# Patient Record
Sex: MALE | Race: WHITE | NOT HISPANIC OR LATINO | Employment: UNEMPLOYED | URBAN - METROPOLITAN AREA
[De-identification: names, ages, dates, MRNs, and addresses within clinical notes are randomized per-mention and may not be internally consistent; named-entity substitution may affect disease eponyms.]

---
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
## 2018-01-10 NOTE — RESULT NOTES
Message   labs and UA wnl  Verified Results  (1) COMPREHENSIVE METABOLIC PANEL [id number] 37:35UD [facility]   TW Order Number: [id number]     Test Name Result Flag Reference   GLUCOSE,RANDM 90 mg/dL     If the patient is fasting, the ADA then defines impaired fasting glucose as > 100 mg/dL and diabetes as > or equal to 123 mg/dL  SODIUM 139 mmol/L  136-145   POTASSIUM 4 0 mmol/L  3 5-5 3   CHLORIDE 104 mmol/L  100-108   CARBON DIOXIDE 30 mmol/L  21-32   ANION GAP (CALC) 5 mmol/L  4-13   BLOOD UREA NITROGEN 17 mg/dL  5-25   CREATININE 1 24 mg/dL  0 60-1 30   Standardized to IDMS reference method   CALCIUM 9 1 mg/dL  8 3-10 1   BILI, TOTAL 0 75 mg/dL  0 20-1 00   ALK PHOSPHATAS 108 U/L     ALT (SGPT) 65 U/L  12-78   AST(SGOT) 31 U/L  5-45   ALBUMIN 4 3 g/dL  3 5-5 0   TOTAL PROTEIN 7 7 g/dL  6 4-8 2   eGFR Non-African American      >60 0 ml/min/1 73sq m   [facility] [employer] Disease Education Program recommendations are as follows:  GFR calculation is accurate only with a steady state creatinine  Chronic Kidney disease less than 60 ml/min/1 73 sq  meters  Kidney failure less than 15 ml/min/1 73 sq  meters       (1) CBC/PLT/DIFF 27Oct2016 09:56AM [facility]   TW Order Number: [id number]     Test Name Result Flag Reference   WBC COUNT 6 69 Thousand/uL  4 31-10 16   RBC COUNT 5 53 Million/uL  3 88-5 62   HEMOGLOBIN 16 3 g/dL  12 0-17 0   HEMATOCRIT 46 9 %  36 5-49 3   MCV 85 fL  82-98   MCH 29 5 pg  26 8-34 3   MCHC 34 8 g/dL  31 4-37 4   RDW 12 7 %  11 6-15 1   MPV 9 6 fL  8 9-12 7   PLATELET COUNT 177 Thousands/uL  149-390   nRBC AUTOMATED 0 /100 WBCs     NEUTROPHILS RELATIVE PERCENT 64 %  43-75   LYMPHOCYTES RELATIVE PERCENT 23 %  14-44   MONOCYTES RELATIVE PERCENT 11 %  4-12   EOSINOPHILS RELATIVE PERCENT 2 %  0-6   BASOPHILS RELATIVE PERCENT 0 %  0-1   NEUTROPHILS ABSOLUTE COUNT 4 26 Thousands/?L  1 85-7 62   LYMPHOCYTES ABSOLUTE COUNT 1 54 Thousands/?L  0 60-4 47 MONOCYTES ABSOLUTE COUNT 0 71 Thousand/?L  0 17-1 22   EOSINOPHILS ABSOLUTE COUNT 0 13 Thousand/?L  0 00-0 61   BASOPHILS ABSOLUTE COUNT 0 03 Thousands/?L  0 00-0 10   - Patient Instructions: This bloodwork is non-fasting  Please drink two glasses of water morning of bloodwork  - Patient Instructions: This bloodwork is non-fasting  Please drink two glasses of water morning of bloodwork       (1) URINALYSIS (will reflex a microscopy if leukocytes, occult blood, protein or nitrites are not within normal limits) 27Oct2016 09:56AM Nayana Anderson    Order Number: [id number]     Test Name Result Flag Reference   COLOR Dk Yellow     CLARITY Turbid     SPECIFIC GRAVITY UA 1 029  1 003-1 030   PH UA 6 0  4 5-8 0   LEUKOCYTE ESTERASE UA Negative  Negative   NITRITE UA Negative  Negative   PROTEIN UA Trace mg/dl A Negative   GLUCOSE UA Negative mg/dl  Negative   KETONES UA Negative mg/dl  Negative   UROBILINOGEN UA 0 2 E U /dl  0 2, 1 0 E U /dl   BILIRUBIN UA Negative  Negative   BLOOD UA Negative  Negative

## 2018-01-12 VITALS
SYSTOLIC BLOOD PRESSURE: 138 MMHG | BODY MASS INDEX: 33.91 KG/M2 | WEIGHT: 250.38 LBS | HEIGHT: 72 IN | DIASTOLIC BLOOD PRESSURE: 72 MMHG

## 2018-01-14 VITALS
BODY MASS INDEX: 34.13 KG/M2 | TEMPERATURE: 98.6 F | DIASTOLIC BLOOD PRESSURE: 70 MMHG | HEIGHT: 72 IN | WEIGHT: 252 LBS | SYSTOLIC BLOOD PRESSURE: 120 MMHG

## 2018-01-14 NOTE — MISCELLANEOUS
Message  Return to work or school:   Kylah Washington is under my professional care  He was seen in my office on 11/03/2016       Gordy Paget is cleared to play football     Shermon Block Do/am       Signatures   Electronically signed by : Cielo Khanna, ; Nov  3 2016  1:34PM EST                       (Author)

## 2018-01-15 NOTE — PROGRESS NOTES
Assessment    1  Encounter for preventive health examination (V70 0) (Z00 00)    Plan  Health Maintenance    · Follow-up visit in 1 year Evaluation and Treatment  Follow-up  Status: Hold For -  Scheduling  Requested for: [id number]    Discussion/Summary    Impression:   No growth, development, elimination, feeding, skin and sleep concerns  no medical problems  Anticipatory guidance addressed as per the history of present illness section  No vaccines needed  He is not on any medications  Information discussed with patient and mother  Pt  here for football PE  Doing well, diet and exercise encouraged, call if any problems  IUTD  Preventive medicine discusssed  The patient was counseled regarding  Chief Complaint  PT is here today for a school physical       History of Present Illness  HPI: Here for general PE  No cp or sob, or ha  Plays football at [employer]  No complaints, 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  Review of Systems    Constitutional: No complaints of tiredness, feels well, no fever, no chills, no recent weight gain or loss  Eyes: No complaints of eye pain, no discharge from eyes, no eyesight problems, eyes do not itch, no red or dry eyes  ENT: no complaints of nasal discharge, no earache, no loss of hearing, no hoarseness or sore throat, no nosebleeds  Cardiovascular: No complaints of chest pain, no palpitations, normal heart rate, no leg claudication or lower leg edema  Respiratory: No complaints of shortness of breath, no wheezing or cough, no dyspnea on exertion  Gastrointestinal: No complaints of abdominal pain, no nausea or vomiting, no constipation, no diarrhea or bloody stools  Genitourinary: No complaints of testicular pain, no dysuria or nocturia, no incontinence, no hesitancy, no gential lesion  Musculoskeletal: No complaints of joint stiffness or swelling, no myalgias, no limb pain or swelling     Integumentary: No complaints of skin rash, no skin lesions or wounds, no itching, no dry skin  Neurological: No complaints of headache, no numbness or tingling, no dizziness or fainting, no confusion, no convulsions, no limb weakness or difficulty walking  Psychiatric: No complaints of feeling depressed, no suicidal thoughts, no emotional problems, no anxiety, no sleep disturbances or changes in personality  Endocrine: No complaints of muscle weakness, no feelings of weakness, no erectile dysfunction, no deepening of voice, no hot flashes or proptosis  Hematologic/Lymphatic: No complaints of swollen glands, no neck swollen glands, does not bleed or bruise easily  ROS reported by the patient  Active Problems    1  Cellulitis (682 9) (L03 90)   2  Contact dermatitis (692 9) (L25 9)    Surgical History    · History of Oral Surgery Tooth Extraction    Family History  Mother    · No pertinent family history   · No pertinent family history  Father    · No pertinent family history    Social History    · Activities: Football   · Activities: Lacrosse   · Activities: Weight training   · Currently in 10th grade   · Never a smoker   · Never a smoker    Current Meds   1  Benadryl CREA; Therapy: (Recorded:58Wda9508) to Recorded   2  Benadryl TABS; Therapy: (Recorded:01May2015) to Recorded    Allergies    1  Red Dye    2  Strawberry    Vitals   Recorded: 05JNC7904 45:11QR   Systolic 416   Diastolic 80   Height 5 ft 11 5 in   Weight 243 lb 4 00 oz   BMI Calculated 33 45   BSA Calculated 2 3     Physical Exam    Constitutional - General appearance: No acute distress, well appearing and well nourished  Eyes - Conjunctiva and lids: No injection, edema or discharge  Pupils and irises: Equal, round, reactive to light bilaterally  Ophthalmoscopic examination: Optic discs sharp  Ears, Nose, Mouth, and Throat - External inspection of ears and nose: Normal without deformities or discharge   Otoscopic examination: Tympanic membranes gray, translucent with good bony landmarks and light reflex  Canals patent without erythema  Hearing: Normal  Nasal mucosa, septum, and turbinates: Normal, no edema or discharge  Lips, teeth, and gums: Normal, good dentition  Oropharynx: Moist mucosa, normal tongue and tonsils without lesions  Neck - Neck: Supple, symmetric, no masses  Thyroid: No thyromegaly  Pulmonary - Respiratory effort: Normal respiratory rate and rhythm, no increased work of breathing  Percussion of chest: Normal  Palpation of chest: Normal  Auscultation of lungs: Clear bilaterally  Cardiovascular - Palpation of heart: Normal PMI, no thrill  Auscultation of heart: Regular rate and rhythm, normal S1 and S2, no murmur  Carotid pulses: Normal, 2+ bilaterally  Abdominal aorta: Normal  Femoral pulses: Normal, 2+ bilaterally  Pedal pulses: Normal, 2+ bilaterally  Examination of extremities for edema and/or varicosities: Normal    Chest - Breasts: Normal  Palpation of breasts and axillae: Normal    Abdomen - Abdomen: Normal bowel sounds, soft, non-tender, no masses  Liver and spleen: No hepatomegaly or splenomegaly  Examination for hernias: No hernias palpated  Genitourinary - Scrotal contents: Normal, no masses appreciated  Penis: Normal, no lesions  Lymphatic - Palpation of lymph nodes in neck: No anterior or posterior cervical lymphadenopathy  Palpation of lymph nodes in axillae: No lymphadenopathy  Palpation of lymph nodes in groin: No lymphadenopathy  Palpation of lymph nodes in other areas: No lymphadenopathy  Musculoskeletal - Gait and station: Normal gait  Digits and nails: Normal without clubbing or cyanosis  Inspection/palpation of joints, bones, and muscles: Normal  Evaluation for scoliosis: No scoliosis on exam  Range of motion: Normal  Stability: No joint instability  Muscle strength/tone: Normal    Skin - Skin and subcutaneous tissue: No rash or lesions   Palpation of skin and subcutaneous tissue: Normal    Neurologic - Cranial nerves: Normal  Reflexes: Normal  Sensation: Normal    Psychiatric - judgment and insight: Normal  Orientation to person, place, and time: Normal  Recent and remote memory: Normal  Mood and affect: Normal       Signatures   Electronically signed by : Sheryle Numbers, DO; Jul 28 2016  1:47PM EST                       (Author)

## 2018-01-15 NOTE — RESULT NOTES
Message   celiac disease labs wnl  Verified Results  (1) CELIAC DISEASE AB PROFILE 27Oct2016 09:56AM Nayana Anderson    Order Number: [id number]     Test Name Result Flag Reference   tTG IGG <2 U/mL  0 - 5   Negative        0 - 5                                Weak Positive   6 - 9                                Positive           >9   tTG IGA <2 U/mL  0 - 3   Negative        0 -  3                                Weak Positive   4 - 10                                Positive           >10   Tissue Transglutaminase (tTG) has been identified   as the endomysial antigen  Studies have demonstr-   ated that endomysial IgA antibodies have over 99%   specificity for gluten sensitive enteropathy     GLIADA 2 units  0 - 19   Negative                   0 - 19                     Weak Positive             20 - 30                     Moderate to Strong Positive   >30   GLIADG 2 units  0 - 19   Negative                   0 - 19                     Weak Positive             20 - 30                     Moderate to Strong Positive   >30   ENDOMYSIAL AB IGA Negative  Negative   Performed at:  [facility] [employer] [address]  [id number]  : Jodi Waddell MD, Phone:  [phone]    mg/dL  90 - 386

## 2018-01-16 NOTE — RESULT NOTES
Verified Results  (1) URINALYSIS (will reflex a microscopy if leukocytes, occult blood, protein or nitrites are not within normal limits) 27Oct2016 09:56AM Armando [facility]wdows    Order Number: [id number]     Test Name Result Flag Reference   COLOR Dk Yellow     CLARITY Turbid     SPECIFIC GRAVITY UA 1 029  1 003-1 030   PH UA 6 0  4 5-8 0   LEUKOCYTE ESTERASE UA Negative  Negative   NITRITE UA Negative  Negative   PROTEIN UA Trace mg/dl A Negative   GLUCOSE UA Negative mg/dl  Negative   KETONES UA Negative mg/dl  Negative   UROBILINOGEN UA 0 2 E U /dl  0 2, 1 0 E U /dl   BILIRUBIN UA Negative  Negative   BLOOD UA Negative  Negative   AMORPHOUS PHOSPHATES Moderate /hpf     BACTERIA None Seen /hpf  None Seen, Occasional   EPITHELIAL CELLS None Seen /hpf  None Seen, Occasional   RBC UA None Seen /hpf  None Seen   WBC UA None Seen /hpf  None Seen

## 2018-03-15 ENCOUNTER — TELEPHONE (OUTPATIENT)
Dept: FAMILY MEDICINE CLINIC | Facility: CLINIC | Age: 18
End: 2018-03-15

## 2018-03-15 DIAGNOSIS — J32.9 SINUSITIS, UNSPECIFIED CHRONICITY, UNSPECIFIED LOCATION: Primary | ICD-10-CM

## 2018-03-15 RX ORDER — AZITHROMYCIN 250 MG/1
TABLET, FILM COATED ORAL
Qty: 6 TABLET | Refills: 0 | Status: SHIPPED | OUTPATIENT
Start: 2018-03-15 | End: 2018-03-20

## 2018-03-15 NOTE — TELEPHONE ENCOUNTER
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

## 2018-12-18 ENCOUNTER — OFFICE VISIT (OUTPATIENT)
Dept: FAMILY MEDICINE CLINIC | Facility: CLINIC | Age: 18
End: 2018-12-18
Payer: COMMERCIAL

## 2018-12-18 VITALS
WEIGHT: 226.2 LBS | HEIGHT: 72 IN | SYSTOLIC BLOOD PRESSURE: 138 MMHG | BODY MASS INDEX: 30.64 KG/M2 | DIASTOLIC BLOOD PRESSURE: 86 MMHG

## 2018-12-18 DIAGNOSIS — Z23 ENCOUNTER FOR IMMUNIZATION: ICD-10-CM

## 2018-12-18 DIAGNOSIS — E66.9 OBESITY (BMI 30-39.9): ICD-10-CM

## 2018-12-18 DIAGNOSIS — Z00.00 HEALTH CARE MAINTENANCE: Primary | ICD-10-CM

## 2018-12-18 DIAGNOSIS — Z13.220 SCREENING FOR HYPERLIPIDEMIA: ICD-10-CM

## 2018-12-18 DIAGNOSIS — R63.4 WEIGHT LOSS: ICD-10-CM

## 2018-12-18 PROCEDURE — 99395 PREV VISIT EST AGE 18-39: CPT | Performed by: FAMILY MEDICINE

## 2018-12-18 PROCEDURE — 90686 IIV4 VACC NO PRSV 0.5 ML IM: CPT | Performed by: FAMILY MEDICINE

## 2018-12-18 PROCEDURE — 90471 IMMUNIZATION ADMIN: CPT | Performed by: FAMILY MEDICINE

## 2018-12-18 NOTE — PATIENT INSTRUCTIONS
Here for general PE and also lost weight and will need updated labs  Call if any problems  Continue with diet and exercise  Flu shot today  Recheck yearly

## 2018-12-18 NOTE — PROGRESS NOTES
Assessment/Plan:  Chief Complaint   Patient presents with    Physical Exam     Patient Instructions   Here for general PE and also lost weight and will need updated labs  Call if any problems  Continue with diet and exercise  Flu shot today  Recheck yearly  No problem-specific Assessment & Plan notes found for this encounter  Diagnoses and all orders for this visit:    Health care maintenance  -     Comprehensive metabolic panel; Future  -     CBC and differential; Future  -     Lipid Panel with Direct LDL reflex; Future    Obesity (BMI 30-39 9)  -     Comprehensive metabolic panel; Future  -     CBC and differential; Future    Screening for hyperlipidemia  -     Comprehensive metabolic panel; Future  -     Lipid Panel with Direct LDL reflex; Future    Weight loss          Subjective:      Patient ID: Addison Callaway is a 19 y o  male  Here for General PE and doing well  He lost weight after being 89 and lost weight by diet and exercise  Went vegetarian and no longer vegetarian now and running and lifting helped lose weight  The following portions of the patient's history were reviewed and updated as appropriate: allergies, current medications, past family history, past medical history, past social history, past surgical history and problem list     Review of Systems   Constitutional:        Obesity   HENT: Negative  Eyes: Negative  Respiratory: Negative  Cardiovascular: Negative  Gastrointestinal: Negative  Endocrine: Negative  Genitourinary: Negative  Musculoskeletal: Negative  Skin: Negative  Allergic/Immunologic: Negative  Neurological: Negative  Hematological: Negative  Psychiatric/Behavioral: Negative  Objective:      /86   Ht 5' 11 5" (1 816 m)   Wt 103 kg (226 lb 3 2 oz)   BMI 31 11 kg/m²          Physical Exam   Constitutional: He is oriented to person, place, and time  He appears well-developed and well-nourished     obesity HENT:   Head: Normocephalic and atraumatic  Right Ear: External ear normal    Left Ear: External ear normal    Nose: Nose normal    Mouth/Throat: Oropharynx is clear and moist    Eyes: Pupils are equal, round, and reactive to light  Conjunctivae and EOM are normal    Neck: Normal range of motion  Neck supple  Cardiovascular: Normal rate, regular rhythm, normal heart sounds and intact distal pulses  Pulmonary/Chest: Effort normal and breath sounds normal    Musculoskeletal: Normal range of motion  Neurological: He is alert and oriented to person, place, and time  He has normal reflexes  Skin: Skin is warm and dry  Psychiatric: He has a normal mood and affect   His behavior is normal

## 2018-12-19 ENCOUNTER — APPOINTMENT (OUTPATIENT)
Dept: LAB | Facility: CLINIC | Age: 18
End: 2018-12-19
Payer: COMMERCIAL

## 2018-12-19 DIAGNOSIS — Z00.00 HEALTH CARE MAINTENANCE: ICD-10-CM

## 2018-12-19 DIAGNOSIS — E66.9 OBESITY (BMI 30-39.9): ICD-10-CM

## 2018-12-19 DIAGNOSIS — Z13.220 SCREENING FOR HYPERLIPIDEMIA: ICD-10-CM

## 2018-12-19 LAB
ALBUMIN SERPL BCP-MCNC: 4.1 G/DL (ref 3.5–5)
ALP SERPL-CCNC: 71 U/L (ref 46–116)
ALT SERPL W P-5'-P-CCNC: 38 U/L (ref 12–78)
ANION GAP SERPL CALCULATED.3IONS-SCNC: 7 MMOL/L (ref 4–13)
AST SERPL W P-5'-P-CCNC: 20 U/L (ref 5–45)
BASOPHILS # BLD AUTO: 0.06 THOUSANDS/ΜL (ref 0–0.1)
BASOPHILS NFR BLD AUTO: 1 % (ref 0–1)
BILIRUB SERPL-MCNC: 0.65 MG/DL (ref 0.2–1)
BUN SERPL-MCNC: 12 MG/DL (ref 5–25)
CALCIUM SERPL-MCNC: 9.5 MG/DL (ref 8.3–10.1)
CHLORIDE SERPL-SCNC: 103 MMOL/L (ref 100–108)
CHOLEST SERPL-MCNC: 131 MG/DL (ref 50–200)
CO2 SERPL-SCNC: 27 MMOL/L (ref 21–32)
CREAT SERPL-MCNC: 1.23 MG/DL (ref 0.6–1.3)
EOSINOPHIL # BLD AUTO: 0.14 THOUSAND/ΜL (ref 0–0.61)
EOSINOPHIL NFR BLD AUTO: 2 % (ref 0–6)
ERYTHROCYTE [DISTWIDTH] IN BLOOD BY AUTOMATED COUNT: 13 % (ref 11.6–15.1)
GFR SERPL CREATININE-BSD FRML MDRD: 84 ML/MIN/1.73SQ M
GLUCOSE P FAST SERPL-MCNC: 83 MG/DL (ref 65–99)
HCT VFR BLD AUTO: 50.9 % (ref 36.5–49.3)
HDLC SERPL-MCNC: 48 MG/DL (ref 40–60)
HGB BLD-MCNC: 16.5 G/DL (ref 12–17)
IMM GRANULOCYTES # BLD AUTO: 0.02 THOUSAND/UL (ref 0–0.2)
IMM GRANULOCYTES NFR BLD AUTO: 0 % (ref 0–2)
LDLC SERPL CALC-MCNC: 70 MG/DL (ref 0–100)
LYMPHOCYTES # BLD AUTO: 1.52 THOUSANDS/ΜL (ref 0.6–4.47)
LYMPHOCYTES NFR BLD AUTO: 26 % (ref 14–44)
MCH RBC QN AUTO: 29.3 PG (ref 26.8–34.3)
MCHC RBC AUTO-ENTMCNC: 32.4 G/DL (ref 31.4–37.4)
MCV RBC AUTO: 90 FL (ref 82–98)
MONOCYTES # BLD AUTO: 0.78 THOUSAND/ΜL (ref 0.17–1.22)
MONOCYTES NFR BLD AUTO: 14 % (ref 4–12)
NEUTROPHILS # BLD AUTO: 3.24 THOUSANDS/ΜL (ref 1.85–7.62)
NEUTS SEG NFR BLD AUTO: 57 % (ref 43–75)
NRBC BLD AUTO-RTO: 0 /100 WBCS
PLATELET # BLD AUTO: 269 THOUSANDS/UL (ref 149–390)
PMV BLD AUTO: 9.8 FL (ref 8.9–12.7)
POTASSIUM SERPL-SCNC: 3.9 MMOL/L (ref 3.5–5.3)
PROT SERPL-MCNC: 7.3 G/DL (ref 6.4–8.2)
RBC # BLD AUTO: 5.63 MILLION/UL (ref 3.88–5.62)
SODIUM SERPL-SCNC: 137 MMOL/L (ref 136–145)
TRIGL SERPL-MCNC: 67 MG/DL
WBC # BLD AUTO: 5.76 THOUSAND/UL (ref 4.31–10.16)

## 2018-12-19 PROCEDURE — 80053 COMPREHEN METABOLIC PANEL: CPT

## 2018-12-19 PROCEDURE — 36415 COLL VENOUS BLD VENIPUNCTURE: CPT

## 2018-12-19 PROCEDURE — 85025 COMPLETE CBC W/AUTO DIFF WBC: CPT

## 2018-12-19 PROCEDURE — 80061 LIPID PANEL: CPT

## 2020-05-29 ENCOUNTER — TELEMEDICINE (OUTPATIENT)
Dept: FAMILY MEDICINE CLINIC | Facility: CLINIC | Age: 20
End: 2020-05-29
Payer: COMMERCIAL

## 2020-05-29 ENCOUNTER — TELEPHONE (OUTPATIENT)
Dept: FAMILY MEDICINE CLINIC | Facility: CLINIC | Age: 20
End: 2020-05-29

## 2020-05-29 VITALS — HEIGHT: 74 IN | WEIGHT: 225 LBS | BODY MASS INDEX: 28.88 KG/M2

## 2020-05-29 DIAGNOSIS — L23.7 POISON IVY DERMATITIS: Primary | ICD-10-CM

## 2020-05-29 PROBLEM — J98.01 BRONCHOSPASM: Status: ACTIVE | Noted: 2017-08-09

## 2020-05-29 PROCEDURE — 99215 OFFICE O/P EST HI 40 MIN: CPT | Performed by: NURSE PRACTITIONER

## 2020-05-29 RX ORDER — PREDNISONE 10 MG/1
TABLET ORAL
Qty: 21 TABLET | Refills: 0 | Status: SHIPPED | OUTPATIENT
Start: 2020-05-29 | End: 2020-06-15

## 2020-05-29 RX ORDER — CEPHALEXIN 500 MG/1
500 CAPSULE ORAL EVERY 12 HOURS SCHEDULED
Qty: 14 CAPSULE | Refills: 0 | Status: SHIPPED | OUTPATIENT
Start: 2020-05-29 | End: 2020-06-05

## 2020-06-15 ENCOUNTER — OFFICE VISIT (OUTPATIENT)
Dept: FAMILY MEDICINE CLINIC | Facility: CLINIC | Age: 20
End: 2020-06-15
Payer: COMMERCIAL

## 2020-06-15 ENCOUNTER — APPOINTMENT (OUTPATIENT)
Dept: LAB | Facility: CLINIC | Age: 20
End: 2020-06-15
Payer: COMMERCIAL

## 2020-06-15 VITALS
TEMPERATURE: 98.6 F | OXYGEN SATURATION: 98 % | HEIGHT: 74 IN | BODY MASS INDEX: 33.11 KG/M2 | WEIGHT: 258 LBS | DIASTOLIC BLOOD PRESSURE: 88 MMHG | SYSTOLIC BLOOD PRESSURE: 138 MMHG | HEART RATE: 88 BPM

## 2020-06-15 DIAGNOSIS — Z13.220 SCREENING FOR HYPERLIPIDEMIA: ICD-10-CM

## 2020-06-15 DIAGNOSIS — B07.9 VIRAL WARTS, UNSPECIFIED TYPE: ICD-10-CM

## 2020-06-15 DIAGNOSIS — E66.9 OBESITY (BMI 30-39.9): ICD-10-CM

## 2020-06-15 DIAGNOSIS — B07.9 VIRAL WARTS, UNSPECIFIED TYPE: Primary | ICD-10-CM

## 2020-06-15 DIAGNOSIS — Z00.00 HEALTH CARE MAINTENANCE: ICD-10-CM

## 2020-06-15 LAB
ALBUMIN SERPL BCP-MCNC: 4.5 G/DL (ref 3.5–5)
ALP SERPL-CCNC: 78 U/L (ref 46–116)
ALT SERPL W P-5'-P-CCNC: 51 U/L (ref 12–78)
ANION GAP SERPL CALCULATED.3IONS-SCNC: 6 MMOL/L (ref 4–13)
AST SERPL W P-5'-P-CCNC: 23 U/L (ref 5–45)
BASOPHILS # BLD AUTO: 0.04 THOUSANDS/ΜL (ref 0–0.1)
BASOPHILS NFR BLD AUTO: 1 % (ref 0–1)
BILIRUB SERPL-MCNC: 1.37 MG/DL (ref 0.2–1)
BUN SERPL-MCNC: 14 MG/DL (ref 5–25)
CALCIUM SERPL-MCNC: 9.2 MG/DL (ref 8.3–10.1)
CHLORIDE SERPL-SCNC: 100 MMOL/L (ref 100–108)
CHOLEST SERPL-MCNC: 165 MG/DL (ref 50–200)
CO2 SERPL-SCNC: 27 MMOL/L (ref 21–32)
CREAT SERPL-MCNC: 1.14 MG/DL (ref 0.6–1.3)
EOSINOPHIL # BLD AUTO: 0.09 THOUSAND/ΜL (ref 0–0.61)
EOSINOPHIL NFR BLD AUTO: 2 % (ref 0–6)
ERYTHROCYTE [DISTWIDTH] IN BLOOD BY AUTOMATED COUNT: 12.3 % (ref 11.6–15.1)
GFR SERPL CREATININE-BSD FRML MDRD: 91 ML/MIN/1.73SQ M
GLUCOSE P FAST SERPL-MCNC: 92 MG/DL (ref 65–99)
HCT VFR BLD AUTO: 53.2 % (ref 36.5–49.3)
HDLC SERPL-MCNC: 44 MG/DL
HGB BLD-MCNC: 17.8 G/DL (ref 12–17)
IMM GRANULOCYTES # BLD AUTO: 0.04 THOUSAND/UL (ref 0–0.2)
IMM GRANULOCYTES NFR BLD AUTO: 1 % (ref 0–2)
LDLC SERPL CALC-MCNC: 94 MG/DL (ref 0–100)
LYMPHOCYTES # BLD AUTO: 1.65 THOUSANDS/ΜL (ref 0.6–4.47)
LYMPHOCYTES NFR BLD AUTO: 27 % (ref 14–44)
MCH RBC QN AUTO: 29.2 PG (ref 26.8–34.3)
MCHC RBC AUTO-ENTMCNC: 33.5 G/DL (ref 31.4–37.4)
MCV RBC AUTO: 87 FL (ref 82–98)
MONOCYTES # BLD AUTO: 0.6 THOUSAND/ΜL (ref 0.17–1.22)
MONOCYTES NFR BLD AUTO: 10 % (ref 4–12)
NEUTROPHILS # BLD AUTO: 3.76 THOUSANDS/ΜL (ref 1.85–7.62)
NEUTS SEG NFR BLD AUTO: 59 % (ref 43–75)
NRBC BLD AUTO-RTO: 0 /100 WBCS
PLATELET # BLD AUTO: 251 THOUSANDS/UL (ref 149–390)
PMV BLD AUTO: 9.7 FL (ref 8.9–12.7)
POTASSIUM SERPL-SCNC: 4 MMOL/L (ref 3.5–5.3)
PROT SERPL-MCNC: 7.9 G/DL (ref 6.4–8.2)
RBC # BLD AUTO: 6.09 MILLION/UL (ref 3.88–5.62)
SODIUM SERPL-SCNC: 133 MMOL/L (ref 136–145)
TRIGL SERPL-MCNC: 133 MG/DL
WBC # BLD AUTO: 6.18 THOUSAND/UL (ref 4.31–10.16)

## 2020-06-15 PROCEDURE — 99395 PREV VISIT EST AGE 18-39: CPT | Performed by: FAMILY MEDICINE

## 2020-06-15 PROCEDURE — 36415 COLL VENOUS BLD VENIPUNCTURE: CPT

## 2020-06-15 PROCEDURE — 80061 LIPID PANEL: CPT

## 2020-06-15 PROCEDURE — 3008F BODY MASS INDEX DOCD: CPT | Performed by: FAMILY MEDICINE

## 2020-06-15 PROCEDURE — 85025 COMPLETE CBC W/AUTO DIFF WBC: CPT

## 2020-06-15 PROCEDURE — 80053 COMPREHEN METABOLIC PANEL: CPT

## 2020-06-16 ENCOUNTER — TELEPHONE (OUTPATIENT)
Dept: FAMILY MEDICINE CLINIC | Facility: CLINIC | Age: 20
End: 2020-06-16

## 2020-06-29 ENCOUNTER — TELEPHONE (OUTPATIENT)
Dept: FAMILY MEDICINE CLINIC | Facility: CLINIC | Age: 20
End: 2020-06-29

## 2020-06-29 DIAGNOSIS — L23.7 POISON IVY DERMATITIS: Primary | ICD-10-CM

## 2020-06-29 RX ORDER — PREDNISONE 10 MG/1
TABLET ORAL
Qty: 21 TABLET | Refills: 0 | Status: SHIPPED | OUTPATIENT
Start: 2020-06-29 | End: 2021-11-30 | Stop reason: SDUPTHER

## 2020-12-23 ENCOUNTER — TELEPHONE (OUTPATIENT)
Dept: FAMILY MEDICINE CLINIC | Facility: CLINIC | Age: 20
End: 2020-12-23

## 2020-12-23 DIAGNOSIS — R43.0 ANOSMIA: ICD-10-CM

## 2020-12-23 DIAGNOSIS — Z20.822 ENCOUNTER FOR LABORATORY TESTING FOR COVID-19 VIRUS: Primary | ICD-10-CM

## 2020-12-23 DIAGNOSIS — Z20.822 ENCOUNTER FOR LABORATORY TESTING FOR COVID-19 VIRUS: ICD-10-CM

## 2020-12-23 PROCEDURE — U0003 INFECTIOUS AGENT DETECTION BY NUCLEIC ACID (DNA OR RNA); SEVERE ACUTE RESPIRATORY SYNDROME CORONAVIRUS 2 (SARS-COV-2) (CORONAVIRUS DISEASE [COVID-19]), AMPLIFIED PROBE TECHNIQUE, MAKING USE OF HIGH THROUGHPUT TECHNOLOGIES AS DESCRIBED BY CMS-2020-01-R: HCPCS | Performed by: FAMILY MEDICINE

## 2020-12-24 LAB — SARS-COV-2 RNA SPEC QL NAA+PROBE: DETECTED

## 2020-12-28 ENCOUNTER — TELEMEDICINE (OUTPATIENT)
Dept: FAMILY MEDICINE CLINIC | Facility: CLINIC | Age: 20
End: 2020-12-28
Payer: COMMERCIAL

## 2020-12-28 DIAGNOSIS — R43.0 ANOSMIA: ICD-10-CM

## 2020-12-28 DIAGNOSIS — R05.9 COUGH: ICD-10-CM

## 2020-12-28 DIAGNOSIS — R06.02 SOB (SHORTNESS OF BREATH): ICD-10-CM

## 2020-12-28 DIAGNOSIS — U07.1 COVID-19: Primary | ICD-10-CM

## 2020-12-28 PROCEDURE — 99213 OFFICE O/P EST LOW 20 MIN: CPT | Performed by: FAMILY MEDICINE

## 2021-01-04 ENCOUNTER — TELEMEDICINE (OUTPATIENT)
Dept: FAMILY MEDICINE CLINIC | Facility: CLINIC | Age: 21
End: 2021-01-04
Payer: COMMERCIAL

## 2021-01-04 DIAGNOSIS — U07.1 COVID-19: Primary | ICD-10-CM

## 2021-01-04 PROCEDURE — 99213 OFFICE O/P EST LOW 20 MIN: CPT | Performed by: FAMILY MEDICINE

## 2021-01-04 NOTE — PATIENT INSTRUCTIONS
N/[id number] covid f/up, Patient is asymptomatic and denies fever and cough and doing well, no sob/resp distress and onsmia resolved  Follow CDC guidelines for covid 19 and call if fever or sob/resp distress  Patient may return to school and is medically cleared to return from [place] to [email or web address]

## 2021-01-04 NOTE — PROGRESS NOTES
COVID-19 Virtual Visit     Assessment/Plan:    Problem List Items Addressed This Visit     None      Visit Diagnoses     COVID-19    -  Primary         Disposition:     I recommended continued isolation until at least 24 hours have passed since recovery defined as resolution of fever without the use of fever-reducing medications and improvement in respiratory symptoms (e g , cough, shortness of breath) AND 10 days have passed since onset of symptoms  I have spent 15 minutes directly with the patient  Greater than 50% of this time was spent in counseling/coordination of care regarding: diagnostic results, prognosis, risks and benefits of treatment options, instructions for management, patient and family education, importance of treatment compliance, risk factor reductions and impressions  Encounter provider Jack Jj DO    Provider located at [address] [place] [id number]    Recent Visits  Date Type Provider Dept   12/28/20 Telemedicine Jack Jj DO [facility] recent visits within past 7 days and meeting all other requirements     Today's Visits  Date Type Provider Dept   01/04/21 Telemedicine Jack Jj DO [facility] today's visits and meeting all other requirements     Future Appointments  No visits were found meeting these conditions  Showing future appointments within next 150 days and meeting all other requirements      This virtual check-in was done via [employer] and patient was informed that this is a secure, HIPAA-compliant platform  He agrees to proceed  Patient agrees to participate in a virtual check in via telephone or video visit instead of presenting to the office to address urgent/immediate medical needs  Patient is aware this is a billable service  After connecting through [facility], the patient was identified by name and date of birth   Cindy Springer was informed that this was a telemedicine visit and that the exam was being conducted confidentially over secure lines  My office door was closed  No one else was in the room  Olayinka Enriquez acknowledged consent and understanding of privacy and security of the telemedicine visit  I informed the patient that I have reviewed his record in Epic and presented the opportunity for him to ask any questions regarding the visit today  The patient agreed to participate  Subjective:   Olayinka Enriquez is a 23 y o  male who has been screened for COVID-19  Symptom change since last report: resolving  Date of symptom onset: 12/23/2020    Patient denies fever, cough and shortness of breath  Mel Arnold has been staying home and has isolated themselves in his home  He is taking care to not share personal items and is cleaning all surfaces that are touched often, like counters, tabletops, and doorknobs using household cleaning sprays or wipes  He is wearing a mask when he leaves his room  Lab Results   Component Value Date    SARSCOV2 Detected (A) 12/23/2020     No past medical history on file  Past Surgical History:   Procedure Laterality Date    WISDOM TOOTH EXTRACTION       Current Outpatient Medications   Medication Sig Dispense Refill    predniSONE 10 mg tablet Take 60 mg po day#1, 50 mg po day#2, 40 mg po day#3, 30 mg po day#4, 20 mg po day#5m and 10 mg po day#6 21 tablet 0     No current facility-administered medications for this visit  Allergies   Allergen Reactions    Red Dye     Strawberry Extract        Review of Systems   Constitutional: Negative  Negative for fever  HENT: Negative  Eyes: Negative  Respiratory: Negative  Negative for cough and shortness of breath  Cardiovascular: Negative  Gastrointestinal: Negative  Endocrine: Negative  Genitourinary: Negative  Musculoskeletal: Negative  Skin: Negative  Allergic/Immunologic: Negative  Neurological: Negative  Hematological: Negative  Psychiatric/Behavioral: Negative  Objective: There were no vitals filed for this visit  Physical Exam  Constitutional:       Appearance: Normal appearance  HENT:      Head: Normocephalic and atraumatic  Eyes:      Conjunctiva/sclera: Conjunctivae normal    Pulmonary:      Effort: Pulmonary effort is normal  No respiratory distress  Skin:     Coloration: Skin is not pale  Neurological:      General: No focal deficit present  Mental Status: He is alert and oriented to person, place, and time  Psychiatric:         Mood and Affect: Mood normal          Behavior: Behavior normal          Thought Content: Thought content normal          Judgment: Judgment normal        Patient Instructions   [facility]/[phone] covid f/up, Patient is asymptomatic and denies fever and cough and doing well, no sob/resp distress and onsmia resolved  Follow CDC guidelines for covid 19 and call if fever or sob/resp distress  Patient may return to school and is medically cleared to return from [place] to [email or web address]  [facility]      VIRTUAL VISIT DISCLAIMER    Loraine Gonzalez acknowledges that he has consented to an online visit or consultation  He understands that the online visit is based solely on information provided by him, and that, in the absence of a face-to-face physical evaluation by the physician, the diagnosis he receives is both limited and provisional in terms of accuracy and completeness  This is not intended to replace a full medical face-to-face evaluation by the physician  Loraine Gonzalez understands and accepts these terms

## 2021-04-13 ENCOUNTER — TELEPHONE (OUTPATIENT)
Dept: FAMILY MEDICINE CLINIC | Facility: CLINIC | Age: 21
End: 2021-04-13

## 2021-04-13 NOTE — TELEPHONE ENCOUNTER
Accidentally marked in pt's fathers chart    similar names and brithdays  Copied is the conversation that should have been posted in pt's chart  DO Franklyn Truong 8 minutes ago (2:55 PM)     Monitor for DVT and redness and swelling in extremities and any signs of blood clots  Very rare, but would like to monitor for DVT's  Message text       You routed conversation to Josee Dhillon DO 14 minutes ago (2:49 PM)      You 14 minutes ago (2:49 PM)        Patient called today  Had J&J last week on 04/08/2021  He had symptoms of headaches, abdominal pain and cramping, and shortness of breath  Patient also was super cold and super hot  Patient said this lasted about a day  He got the symptoms 12 hours after receiving the vaccine  Patients school told him he needs to contact his PCP  Please advise

## 2021-10-21 ENCOUNTER — TELEPHONE (OUTPATIENT)
Dept: FAMILY MEDICINE CLINIC | Facility: CLINIC | Age: 21
End: 2021-10-21

## 2021-10-21 DIAGNOSIS — R05.9 COUGH: Primary | ICD-10-CM

## 2021-10-21 RX ORDER — BENZONATATE 200 MG/1
200 CAPSULE ORAL 3 TIMES DAILY PRN
Qty: 30 CAPSULE | Refills: 0 | Status: SHIPPED | OUTPATIENT
Start: 2021-10-21 | End: 2022-07-01

## 2021-11-26 ENCOUNTER — TELEMEDICINE (OUTPATIENT)
Dept: FAMILY MEDICINE CLINIC | Facility: CLINIC | Age: 21
End: 2021-11-26
Payer: COMMERCIAL

## 2021-11-26 DIAGNOSIS — R50.9 FEVER, UNSPECIFIED FEVER CAUSE: ICD-10-CM

## 2021-11-26 DIAGNOSIS — R05.9 COUGH: Primary | ICD-10-CM

## 2021-11-26 DIAGNOSIS — R09.82 PND (POST-NASAL DRIP): ICD-10-CM

## 2021-11-26 PROCEDURE — 99213 OFFICE O/P EST LOW 20 MIN: CPT | Performed by: FAMILY MEDICINE

## 2021-11-26 RX ORDER — AZITHROMYCIN 250 MG/1
TABLET, FILM COATED ORAL
Qty: 6 TABLET | Refills: 0 | Status: SHIPPED | OUTPATIENT
Start: 2021-11-26 | End: 2021-12-01

## 2021-11-30 ENCOUNTER — TELEPHONE (OUTPATIENT)
Dept: FAMILY MEDICINE CLINIC | Facility: CLINIC | Age: 21
End: 2021-11-30

## 2021-11-30 DIAGNOSIS — L23.7 POISON IVY DERMATITIS: ICD-10-CM

## 2021-11-30 RX ORDER — PREDNISONE 10 MG/1
TABLET ORAL
Qty: 21 TABLET | Refills: 0 | Status: SHIPPED | OUTPATIENT
Start: 2021-11-30 | End: 2022-07-01

## 2021-12-20 ENCOUNTER — TELEPHONE (OUTPATIENT)
Dept: FAMILY MEDICINE CLINIC | Facility: CLINIC | Age: 21
End: 2021-12-20

## 2021-12-20 DIAGNOSIS — J06.9 UPPER RESPIRATORY TRACT INFECTION, UNSPECIFIED TYPE: Primary | ICD-10-CM

## 2021-12-20 RX ORDER — CEFDINIR 300 MG/1
300 CAPSULE ORAL EVERY 12 HOURS SCHEDULED
Qty: 14 CAPSULE | Refills: 0 | Status: SHIPPED | OUTPATIENT
Start: 2021-12-20 | End: 2021-12-27

## 2022-01-06 ENCOUNTER — CLINICAL SUPPORT (OUTPATIENT)
Dept: FAMILY MEDICINE CLINIC | Facility: CLINIC | Age: 22
End: 2022-01-06

## 2022-01-06 DIAGNOSIS — Z11.52 ENCOUNTER FOR SCREENING FOR COVID-19: Primary | ICD-10-CM

## 2022-01-06 PROCEDURE — U0005 INFEC AGEN DETEC AMPLI PROBE: HCPCS | Performed by: FAMILY MEDICINE

## 2022-01-06 PROCEDURE — U0003 INFECTIOUS AGENT DETECTION BY NUCLEIC ACID (DNA OR RNA); SEVERE ACUTE RESPIRATORY SYNDROME CORONAVIRUS 2 (SARS-COV-2) (CORONAVIRUS DISEASE [COVID-19]), AMPLIFIED PROBE TECHNIQUE, MAKING USE OF HIGH THROUGHPUT TECHNOLOGIES AS DESCRIBED BY CMS-2020-01-R: HCPCS | Performed by: FAMILY MEDICINE

## 2022-01-08 LAB — SARS-COV-2 RNA RESP QL NAA+PROBE: NEGATIVE

## 2022-07-01 ENCOUNTER — APPOINTMENT (OUTPATIENT)
Dept: LAB | Facility: CLINIC | Age: 22
End: 2022-07-01
Payer: COMMERCIAL

## 2022-07-01 ENCOUNTER — OFFICE VISIT (OUTPATIENT)
Dept: FAMILY MEDICINE CLINIC | Facility: CLINIC | Age: 22
End: 2022-07-01
Payer: COMMERCIAL

## 2022-07-01 DIAGNOSIS — Z83.42 FAMILY HISTORY OF HYPERCHOLESTEROLEMIA: ICD-10-CM

## 2022-07-01 DIAGNOSIS — Z13.220 SCREENING FOR HYPERCHOLESTEROLEMIA: ICD-10-CM

## 2022-07-01 DIAGNOSIS — K64.5 THROMBOSED HEMORRHOIDS: ICD-10-CM

## 2022-07-01 DIAGNOSIS — R03.0 ELEVATED BLOOD PRESSURE READING WITHOUT DIAGNOSIS OF HYPERTENSION: Primary | ICD-10-CM

## 2022-07-01 DIAGNOSIS — R03.0 ELEVATED BLOOD PRESSURE READING WITHOUT DIAGNOSIS OF HYPERTENSION: ICD-10-CM

## 2022-07-01 LAB
ALBUMIN SERPL BCP-MCNC: 4.1 G/DL (ref 3.5–5)
ALP SERPL-CCNC: 72 U/L (ref 46–116)
ALT SERPL W P-5'-P-CCNC: 48 U/L (ref 12–78)
ANION GAP SERPL CALCULATED.3IONS-SCNC: 6 MMOL/L (ref 4–13)
AST SERPL W P-5'-P-CCNC: 28 U/L (ref 5–45)
BILIRUB SERPL-MCNC: 0.97 MG/DL (ref 0.2–1)
BUN SERPL-MCNC: 17 MG/DL (ref 5–25)
CALCIUM SERPL-MCNC: 9.4 MG/DL (ref 8.3–10.1)
CHLORIDE SERPL-SCNC: 107 MMOL/L (ref 100–108)
CHOLEST SERPL-MCNC: 141 MG/DL
CO2 SERPL-SCNC: 27 MMOL/L (ref 21–32)
CREAT SERPL-MCNC: 1.17 MG/DL (ref 0.6–1.3)
GFR SERPL CREATININE-BSD FRML MDRD: 86 ML/MIN/1.73SQ M
GLUCOSE P FAST SERPL-MCNC: 82 MG/DL (ref 65–99)
HDLC SERPL-MCNC: 45 MG/DL
LDLC SERPL CALC-MCNC: 79 MG/DL (ref 0–100)
POTASSIUM SERPL-SCNC: 4 MMOL/L (ref 3.5–5.3)
PROT SERPL-MCNC: 7.7 G/DL (ref 6.4–8.2)
SODIUM SERPL-SCNC: 140 MMOL/L (ref 136–145)
TRIGL SERPL-MCNC: 86 MG/DL

## 2022-07-01 PROCEDURE — 99214 OFFICE O/P EST MOD 30 MIN: CPT | Performed by: FAMILY MEDICINE

## 2022-07-01 PROCEDURE — 80053 COMPREHEN METABOLIC PANEL: CPT

## 2022-07-01 PROCEDURE — 80061 LIPID PANEL: CPT

## 2022-07-01 PROCEDURE — 36415 COLL VENOUS BLD VENIPUNCTURE: CPT

## 2022-07-01 PROCEDURE — 3725F SCREEN DEPRESSION PERFORMED: CPT | Performed by: FAMILY MEDICINE

## 2022-07-01 RX ORDER — HYDROCORTISONE 25 MG/G
CREAM TOPICAL 2 TIMES DAILY
Qty: 28 G | Refills: 0 | Status: SHIPPED | OUTPATIENT
Start: 2022-07-01

## 2022-07-01 NOTE — PROGRESS NOTES
Assessment/Plan:     Diagnoses and all orders for this visit:    Elevated blood pressure reading without diagnosis of hypertension  -     Comprehensive metabolic panel; Future    Family history of hypercholesterolemia  -     Lipid Panel with Direct LDL reflex; Future    Screening for hypercholesterolemia  -     Lipid Panel with Direct LDL reflex; Future    Thrombosed hemorrhoids  -     hydrocortisone (ANUSOL-HC) 2 5 % rectal cream; Apply topically 2 (two) times a day        Patient will update yearly lab work today  Patient continues monitor home BP and send in numbers through [facility]  chart   Home treatment for symptomatic hemorrhoids discussed  Patient will do the Anusol cream as well as using "TUCKS" for local discomfort control  Alerted the patient that hemorrhoid sometimes self, "open and drain    Alerted him to expectation that he would see bleeding in the toilet as well as when wiping  If symptoms continue or worsen will need to refer to colorectal for hemorrhoidal excision  Subjective:   Chief Complaint   Patient presents with    Hemorrhoids     Woke up yesterday morning with what he believes is an external thrombosed hemorrhoid  He sat in a car to drive from [facility] to here  Denies blood  Patient ID: Alexandr Hernandez is a 23 y o  male  Patient is a 78-year-old male  who recently traveled 10 hours nonstop from [place] to return back home  He was fine when he went to bed but woke up with sensation of lump and tenderness of the rectum  He had no bleeding  He is not constipated  Has been trying to improve fitness and dietary, Cardio one hour and weight lifting  Girlfriend in [place] (dietician)    He will be hearing soon about a job opportunity in [place]  He is very excited about that    The following portions of the patient's history were reviewed and updated as appropriate: allergies, current medications, past family history, past medical history, past social history, past surgical history and problem list       Review of Systems   Gastrointestinal: Negative for constipation  Felt lump on rectal, pain level minimal to moderate fluctuates after walking  No symptoms sleeping or sitting   All other systems reviewed and are negative  Objective:      /86 (BP Location: Right arm, Cuff Size: Large) Comment: Patient does home monitoring of BP  Pulse 87   Temp (!) 97 2 °F (36 2 °C) (Temporal)   Ht 6' (1 829 m)   Wt 119 kg (262 lb)   SpO2 97%   BMI 35 53 kg/m²          Physical Exam  Constitutional:       Appearance: Normal appearance  Comments: 49-year-old male who appears his stated age with an elevated BMI   Cardiovascular:      Rate and Rhythm: Normal rate and regular rhythm  Pulmonary:      Effort: Pulmonary effort is normal       Breath sounds: Normal breath sounds  Abdominal:      General: Bowel sounds are normal       Palpations: Abdomen is soft  Tenderness: There is no abdominal tenderness  Genitourinary:     Rectum: Tenderness and external hemorrhoid present  Neurological:      Mental Status: He is alert

## 2022-07-03 VITALS
TEMPERATURE: 97.2 F | BODY MASS INDEX: 35.49 KG/M2 | HEART RATE: 87 BPM | WEIGHT: 262 LBS | OXYGEN SATURATION: 97 % | HEIGHT: 72 IN | SYSTOLIC BLOOD PRESSURE: 122 MMHG | DIASTOLIC BLOOD PRESSURE: 86 MMHG

## 2022-07-03 NOTE — PATIENT INSTRUCTIONS
Hemorrhoids   AMBULATORY CARE:   Hemorrhoids  are swollen blood vessels inside your rectum (internal hemorrhoids) or on your anus (external hemorrhoids)  Sometimes a hemorrhoid may prolapse  This means it extends out of your anus  Common symptoms include the following:   Pain or itching around your anus or inside your rectum    Swelling or bumps around your anus    Bright red blood in your bowel movement, on the toilet paper, or in the toilet bowl    Tissue bulging out of your anus (prolapsed hemorrhoids)    Incontinence (poor control over urine or bowel movements)    Seek care immediately if:   You have severe pain in your rectum or around your anus  You have severe pain in your abdomen and you are vomiting  You have bleeding from your anus that soaks through your underwear  Contact your healthcare provider if:   You have frequent and painful bowel movements  Your hemorrhoid looks or feels more swollen than usual      You do not have a bowel movement for 2 days or more  You see or feel tissue coming through your anus  You have questions or concerns about your condition or care  Treatment for hemorrhoids  may include medicines to decrease pain, swelling, and itching  The medicine may be a pill, pad, cream, or ointment  Medicine may also be given to soften your bowel movement  Surgery and other procedures may be needed to shrink or remove your hemorrhoids  Manage your symptoms:   Apply ice on your anus for 15 to 20 minutes every hour or as directed  Use an ice pack, or put crushed ice in a plastic bag  Cover it with a towel before you apply it to your anus  Ice helps prevent tissue damage and decreases swelling and pain  Take a sitz bath  Fill a bathtub with 4 to 6 inches of warm water  You may also use a sitz bath pan that fits inside a toilet bowl  Sit in the sitz bath for 15 minutes  Do this 3 times a day, and after each bowel movement   The warm water can help decrease pain and swelling  Keep your anal area clean  Gently wash the area with warm water daily  Soap may irritate the area  After a bowel movement, wipe with moist towelettes or wet toilet paper  Dry toilet paper can irritate the area  Prevent hemorrhoids:   Do not strain to have a bowel movement  Do not sit on the toilet too long  These actions can increase pressure on the tissues in your rectum and anus  Drink plenty of liquids  Liquids can help prevent constipation  Ask how much liquid to drink each day and which liquids are best for you  Eat a variety of high-fiber foods  Examples include fruits, vegetables, and whole grains  Ask your healthcare provider how much fiber you need each day  You may need to take a fiber supplement  Exercise as directed  Exercise, such as walking, may make it easier to have a bowel movement  Ask your healthcare provider to help you create an exercise plan  Do not have anal sex  Anal sex can weaken the skin around your rectum and anus  Avoid heavy lifting  This can cause straining and increase your risk for another hemorrhoid  Follow up with your doctor as directed:  Write down your questions so you remember to ask them during your visits  © Copyright [employer] 2022 Information is for End User's use only and may not be sold, redistributed or otherwise used for commercial purposes  All illustrations and images included in CareNotes® are the copyrighted property of [employer]  or [employer] [place]  The above information is an  only  It is not intended as medical advice for individual conditions or treatments  Talk to your doctor, nurse or pharmacist before following any medical regimen to see if it is safe and effective for you

## 2022-08-30 ENCOUNTER — OFFICE VISIT (OUTPATIENT)
Dept: FAMILY MEDICINE CLINIC | Facility: CLINIC | Age: 22
End: 2022-08-30

## 2022-08-30 VITALS — HEIGHT: 73 IN | BODY MASS INDEX: 36.05 KG/M2 | WEIGHT: 272 LBS

## 2022-08-30 DIAGNOSIS — Z02.89 ENCOUNTER FOR FEDERAL AVIATION ADMINISTRATION (FAA) EXAMINATION: Primary | ICD-10-CM

## 2022-08-30 PROCEDURE — 99499 UNLISTED E&M SERVICE: CPT | Performed by: FAMILY MEDICINE

## 2022-08-30 NOTE — PROGRESS NOTES
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

## 2022-09-30 ENCOUNTER — TELEMEDICINE (OUTPATIENT)
Dept: FAMILY MEDICINE CLINIC | Facility: CLINIC | Age: 22
End: 2022-09-30
Payer: COMMERCIAL

## 2022-09-30 DIAGNOSIS — J06.9 UPPER RESPIRATORY TRACT INFECTION, UNSPECIFIED TYPE: Primary | ICD-10-CM

## 2022-09-30 PROCEDURE — 99441 PR PHYS/QHP TELEPHONE EVALUATION 5-10 MIN: CPT | Performed by: FAMILY MEDICINE

## 2022-09-30 RX ORDER — CEFDINIR 300 MG/1
300 CAPSULE ORAL EVERY 12 HOURS SCHEDULED
Qty: 14 CAPSULE | Refills: 0 | Status: SHIPPED | OUTPATIENT
Start: 2022-09-30 | End: 2022-10-07

## 2022-09-30 NOTE — PROGRESS NOTES
Virtual Brief Visit    Patient is located in the following state in which I hold an active license [place]      Assessment/Plan:    Occurs about once a year during this time  Denies SOB or fevers  COVID negative at home  Has been given cefdinir in the past and tolerated well despite red dye allergy  Will order cefdinir, recommend picking up by end of weekend only if symptoms persist  Continue OTC meds  RTC PRN    Problem List Items Addressed This Visit        Respiratory    Upper respiratory tract infection - Primary    Relevant Medications    cefdinir (OMNICEF) 300 mg capsule        Review of Systems - Negative except Sore throat and cough        Recent Visits  No visits were found meeting these conditions  Showing recent visits within past 7 days and meeting all other requirements  Today's Visits  Date Type Provider Dept   09/30/22 Telemedicine Roxanne Holguin MD [facility] Primary [facility]   Showing today's visits and meeting all other requirements  Future Appointments  No visits were found meeting these conditions    Showing future appointments within next 150 days and meeting all other requirements         I spent 7 minutes directly with the patient during this visit

## 2022-11-11 ENCOUNTER — OFFICE VISIT (OUTPATIENT)
Dept: FAMILY MEDICINE CLINIC | Facility: CLINIC | Age: 22
End: 2022-11-11

## 2022-11-11 VITALS
SYSTOLIC BLOOD PRESSURE: 126 MMHG | HEART RATE: 90 BPM | BODY MASS INDEX: 36.95 KG/M2 | HEIGHT: 72 IN | RESPIRATION RATE: 18 BRPM | DIASTOLIC BLOOD PRESSURE: 78 MMHG | OXYGEN SATURATION: 97 % | WEIGHT: 272.8 LBS | TEMPERATURE: 97.3 F

## 2022-11-11 DIAGNOSIS — R05.9 COUGH, UNSPECIFIED TYPE: Primary | ICD-10-CM

## 2022-11-11 RX ORDER — PREDNISONE 10 MG/1
TABLET ORAL
Qty: 21 TABLET | Refills: 0 | Status: SHIPPED | OUTPATIENT
Start: 2022-11-11

## 2022-11-11 NOTE — PROGRESS NOTES
Name: Edson Price      : 2000      MRN: [id number]  Encounter Provider: Bean Wiley DO  Encounter Date: 2022   Encounter department: [address] [place] [address]  Chief Complaint   Patient presents with   • Cough     Cough and runny nose x 3 weeks productive cough pt denies fever has tried over the counter medications no improvement  Patient Instructions   Here for chronic cough and will rec staying well hydrated and use OTC dimetapp DM cold and cough prn and prednisone as directed  Assessment & Plan     1  Cough, unspecified type  -     predniSONE 10 mg tablet; Take 60 mg po day#1, 50 mg po day#2, 40 mg po day#3, 30 mg po day#4, 20 mg po day#5m and 10 mg po day#6           Subjective      Cough (Cough and runny nose x 3 weeks productive cough pt denies fever has tried over the counter medications no improvement ) Abx started and f-up with otc meds and is idle and during day not much a problem but at bedtime uses Nyquil and has brown color phlegm and feels fine during day  Review of Systems   Constitutional: Negative  HENT: Negative  Eyes: Negative  Respiratory: Positive for cough  Cardiovascular: Negative  Gastrointestinal: Negative  Endocrine: Negative  Genitourinary: Negative  Musculoskeletal: Negative  Skin: Negative  Allergic/Immunologic: Negative  Neurological: Negative  Hematological: Negative  Psychiatric/Behavioral: Negative          Current Outpatient Medications on File Prior to Visit   Medication Sig   • hydrocortisone (ANUSOL-HC) 2 5 % rectal cream Apply topically 2 (two) times a day (Patient not taking: Reported on 2022)       Objective     /78 (BP Location: Left arm, Patient Position: Sitting, Cuff Size: Large)   Pulse 90   Temp (!) 97 3 °F (36 3 °C) (Temporal)   Resp 18   Ht 6' (1 829 m)   Wt 124 kg (272 lb 12 8 oz)   SpO2 97%   BMI 37 00 kg/m²     Physical Exam  Constitutional:       Appearance: He is well-developed  HENT:      Head: Normocephalic and atraumatic  Right Ear: External ear normal       Left Ear: External ear normal       Nose: Nose normal    Eyes:      Conjunctiva/sclera: Conjunctivae normal       Pupils: Pupils are equal, round, and reactive to light  Cardiovascular:      Rate and Rhythm: Normal rate and regular rhythm  Heart sounds: Normal heart sounds  Pulmonary:      Effort: Pulmonary effort is normal       Breath sounds: Normal breath sounds  Musculoskeletal:         General: Normal range of motion  Cervical back: Normal range of motion and neck supple  Skin:     General: Skin is warm and dry  Neurological:      Mental Status: He is alert and oriented to person, place, and time  Deep Tendon Reflexes: Reflexes are normal and symmetric     Psychiatric:         Behavior: Behavior normal        Court Benjamin DO

## 2022-11-11 NOTE — PATIENT INSTRUCTIONS
Here for chronic cough and will rec staying well hydrated and use OTC dimetapp DM cold and cough prn and prednisone as directed

## 2022-11-21 ENCOUNTER — APPOINTMENT (OUTPATIENT)
Dept: RADIOLOGY | Facility: MEDICAL CENTER | Age: 22
End: 2022-11-21

## 2022-11-21 ENCOUNTER — TELEPHONE (OUTPATIENT)
Dept: FAMILY MEDICINE CLINIC | Facility: CLINIC | Age: 22
End: 2022-11-21

## 2022-11-21 DIAGNOSIS — R05.9 COUGH, UNSPECIFIED TYPE: Primary | ICD-10-CM

## 2022-11-21 DIAGNOSIS — R05.9 COUGH, UNSPECIFIED TYPE: ICD-10-CM

## 2022-11-21 RX ORDER — BENZONATATE 200 MG/1
200 CAPSULE ORAL 3 TIMES DAILY PRN
Qty: 20 CAPSULE | Refills: 0 | Status: SHIPPED | OUTPATIENT
Start: 2022-11-21

## 2022-11-21 NOTE — TELEPHONE ENCOUNTER
Dimas Ochoa called the office he was in the office on  11/11/22 for a cough  Pt finished his prednisone script  Pt's cough/ symptoms are is unchanged since office visit/ taking script     Pt's number is [phone]

## 2022-12-12 ENCOUNTER — OFFICE VISIT (OUTPATIENT)
Dept: FAMILY MEDICINE CLINIC | Facility: CLINIC | Age: 22
End: 2022-12-12

## 2022-12-12 VITALS
WEIGHT: 268.4 LBS | DIASTOLIC BLOOD PRESSURE: 84 MMHG | BODY MASS INDEX: 36.35 KG/M2 | HEART RATE: 79 BPM | RESPIRATION RATE: 18 BRPM | OXYGEN SATURATION: 99 % | SYSTOLIC BLOOD PRESSURE: 128 MMHG | HEIGHT: 72 IN | TEMPERATURE: 97.6 F

## 2022-12-12 DIAGNOSIS — R05.9 COUGH, UNSPECIFIED TYPE: Primary | ICD-10-CM

## 2022-12-12 DIAGNOSIS — J02.9 SORE THROAT: ICD-10-CM

## 2022-12-12 DIAGNOSIS — J32.9 SINUSITIS, UNSPECIFIED CHRONICITY, UNSPECIFIED LOCATION: ICD-10-CM

## 2022-12-12 DIAGNOSIS — R04.2 BLOOD IN SPUTUM: ICD-10-CM

## 2022-12-12 DIAGNOSIS — R09.82 POST-NASAL DRIP: ICD-10-CM

## 2022-12-12 RX ORDER — DEXTROMETHORPHAN HYDROBROMIDE AND PROMETHAZINE HYDROCHLORIDE 15; 6.25 MG/5ML; MG/5ML
5 SOLUTION ORAL 4 TIMES DAILY PRN
Qty: 118 ML | Refills: 1 | Status: SHIPPED | OUTPATIENT
Start: 2022-12-12

## 2022-12-12 RX ORDER — PREDNISONE 10 MG/1
TABLET ORAL
Qty: 21 TABLET | Refills: 0 | Status: SHIPPED | OUTPATIENT
Start: 2022-12-12

## 2022-12-12 NOTE — PROGRESS NOTES
Name: Tennille Hines      : 2000      MRN: [id number]  Encounter Provider: Pranay Arteaga DO  Encounter Date: 2022   Encounter department: [address] [place] [address] Road     1  Cough, unspecified type  -     Promethazine-DM (PHENERGAN-DM) 6 25-15 mg/5 mL oral syrup; Take 5 mL by mouth 4 (four) times a day as needed for cough  -     predniSONE 10 mg tablet; Take 60 mg po day#1, 50 mg po day#2, 40 mg po day#3, 30 mg po day#4, 20 mg po day#5m and 10 mg po day#6  -     Ambulatory Referral to Otolaryngology; Future    2  Sinusitis, unspecified chronicity, unspecified location  -     predniSONE 10 mg tablet; Take 60 mg po day#1, 50 mg po day#2, 40 mg po day#3, 30 mg po day#4, 20 mg po day#5m and 10 mg po day#6  -     Ambulatory Referral to Otolaryngology; Future    3  Post-nasal drip  -     predniSONE 10 mg tablet; Take 60 mg po day#1, 50 mg po day#2, 40 mg po day#3, 30 mg po day#4, 20 mg po day#5m and 10 mg po day#6  -     Ambulatory Referral to Otolaryngology; Future    4  Blood in sputum  -     Ambulatory Referral to Otolaryngology; Future    5  Sore throat  -     Ambulatory Referral to Otolaryngology; Future           Subjective      Cough for 2 months, not better  No fever  Patient feels like he has some sinus pressure and denies gerd and also feels like he has pnd as well  He states he coughs up every morning brownish phlegm and specks of blood since last Thursday may be from irritation from cough  Review of Systems   Constitutional: Negative  Negative for fever  HENT: Negative  Eyes: Negative  Respiratory: Positive for cough  Negative for shortness of breath  Cardiovascular: Negative  Gastrointestinal: Negative  Endocrine: Negative  Genitourinary: Negative  Musculoskeletal: Negative  Skin: Negative  Allergic/Immunologic: Negative  Neurological: Negative  Hematological: Negative  Psychiatric/Behavioral: Negative          Current Outpatient Medications on File Prior to Visit   Medication Sig   • [DISCONTINUED] benzonatate (TESSALON) 200 MG capsule Take 1 capsule (200 mg total) by mouth 3 (three) times a day as needed for cough (Patient not taking: Reported on 12/12/2022)   • [DISCONTINUED] hydrocortisone (ANUSOL-HC) 2 5 % rectal cream Apply topically 2 (two) times a day (Patient not taking: Reported on 11/11/2022)   • [DISCONTINUED] predniSONE 10 mg tablet Take 60 mg po day#1, 50 mg po day#2, 40 mg po day#3, 30 mg po day#4, 20 mg po day#5m and 10 mg po day#6 (Patient not taking: Reported on 12/12/2022)       Objective     /84 (BP Location: Left arm, Patient Position: Sitting, Cuff Size: Large)   Pulse 79   Temp 97 6 °F (36 4 °C) (Temporal)   Resp 18   Ht 6' (1 829 m)   Wt 122 kg (268 lb 6 4 oz)   SpO2 99%   BMI 36 40 kg/m²     Physical Exam  Constitutional:       Appearance: He is well-developed  HENT:      Head: Normocephalic and atraumatic  Right Ear: External ear normal       Left Ear: External ear normal       Nose: Nose normal    Eyes:      Conjunctiva/sclera: Conjunctivae normal       Pupils: Pupils are equal, round, and reactive to light  Cardiovascular:      Rate and Rhythm: Normal rate and regular rhythm  Pulses: Normal pulses  Heart sounds: Normal heart sounds  Pulmonary:      Effort: Pulmonary effort is normal  No respiratory distress  Breath sounds: Normal breath sounds  Musculoskeletal:         General: Normal range of motion  Cervical back: Normal range of motion and neck supple  Skin:     General: Skin is warm and dry  Capillary Refill: Capillary refill takes less than 2 seconds  Neurological:      General: No focal deficit present  Mental Status: He is alert and oriented to person, place, and time  Deep Tendon Reflexes: Reflexes are normal and symmetric     Psychiatric:         Mood and Affect: Mood normal          Behavior: Behavior normal          Thought Content:  Thought content normal          Judgment: Judgment normal        Oniel Knee, DO

## 2022-12-12 NOTE — PATIENT INSTRUCTIONS
Continued cough with normal chest xray, consider bronchospasm as cause to cough and will rec trial of prednisone prn cough as it did help with drying phlegm in past  Consult ENT for pnd and sinusitis issues as well as chronic cough and now sore throat and blood in phlegm/mucous  Rec prednisone as he stated he did feel better and did dry up the mucous and phlegm  May use flonase prn PND and sinusitis symptoms  May use phenergan DM prn cough

## 2023-11-30 ENCOUNTER — TELEMEDICINE (OUTPATIENT)
Dept: FAMILY MEDICINE CLINIC | Facility: CLINIC | Age: 23
End: 2023-11-30
Payer: COMMERCIAL

## 2023-11-30 DIAGNOSIS — J01.40 ACUTE NON-RECURRENT PANSINUSITIS: Primary | ICD-10-CM

## 2023-11-30 PROBLEM — L23.7 POISON IVY DERMATITIS: Status: RESOLVED | Noted: 2020-05-29 | Resolved: 2023-11-30

## 2023-11-30 PROBLEM — J06.9 UPPER RESPIRATORY TRACT INFECTION: Status: RESOLVED | Noted: 2022-09-30 | Resolved: 2023-11-30

## 2023-11-30 PROBLEM — J98.01 BRONCHOSPASM: Status: RESOLVED | Noted: 2017-08-09 | Resolved: 2023-11-30

## 2023-11-30 PROCEDURE — 99213 OFFICE O/P EST LOW 20 MIN: CPT | Performed by: FAMILY MEDICINE

## 2023-11-30 RX ORDER — DEXTROMETHORPHAN HYDROBROMIDE AND PROMETHAZINE HYDROCHLORIDE 15; 6.25 MG/5ML; MG/5ML
5 SYRUP ORAL 4 TIMES DAILY PRN
Qty: 180 ML | Refills: 0 | Status: SHIPPED | OUTPATIENT
Start: 2023-11-30

## 2023-11-30 RX ORDER — CEFDINIR 300 MG/1
300 CAPSULE ORAL EVERY 12 HOURS SCHEDULED
Qty: 14 CAPSULE | Refills: 0 | Status: SHIPPED | OUTPATIENT
Start: 2023-11-30 | End: 2023-12-07

## 2023-11-30 NOTE — PROGRESS NOTES
Virtual Regular Visit    Verification of patient location:    Patient is located at Home in the following state in which I hold an active license [place]      Assessment/Plan:    Problem List Items Addressed This Visit          Respiratory    Acute non-recurrent pansinusitis - Primary     Antibiotics as directed                 Reason for visit is No chief complaint on file. Encounter provider Aspen Knox DO    Provider located at [address] [place] [address] PRIMARY CARE  [address]  [address]  [place] [address] [address] [phone]      Recent Visits  No visits were found meeting these conditions. Showing recent visits within past 7 days and meeting all other requirements  Today's Visits  Date Type Provider Dept   11/30/23 Telemedicine Aspen Knox, [address] [place] [address],[facility]   Showing today's visits and meeting all other requirements  Future Appointments  No visits were found meeting these conditions. Showing future appointments within next 150 days and meeting all other requirements       The patient was identified by name and date of birth. Freida Klinefelter was informed that this is a telemedicine visit and that the visit is being conducted through the [address] [place] [address] 22 v[employer] platform. He agrees to proceed. .  My office door was closed. No one else was in the room. He acknowledged consent and understanding of privacy and security of the video platform. The patient has agreed to participate and understands they can discontinue the visit at any time. Patient is aware this is a billable service. Subjective  Freida Klinefelter is a 20 y.o. male 5 to 6 days of fever cough congestion and some sore throat .       Patient started to feel congestion and sinus pressure Saturday night Patient notes symptoms continue to get worse Initially he had clear mucous and now it is green His throat is getting more sore Patient has sinus pain increase and has had temperature However it has been between 100 and 101 Patient is taking dayquil/nyquil with no issues Patient has test negaitve for covid  No sick contact that he is aware of    URI   This is a new problem. The current episode started in the past 7 days. The problem has been gradually worsening. The maximum temperature recorded prior to his arrival was 100.4 - 100.9 F. The fever has been present for 3 to 4 days. Associated symptoms include congestion, coughing, ear pain, headaches, rhinorrhea, sinus pain and a sore throat. Pertinent negatives include no abdominal pain, chest pain, diarrhea, dysuria, joint pain, joint swelling, nausea, neck pain, plugged ear sensation, rash, sneezing, swollen glands, vomiting or wheezing. He has tried antihistamine, decongestant and increased fluids for the symptoms. The treatment provided mild relief. No past medical history on file. Past Surgical History:   Procedure Laterality Date    WISDOM TOOTH EXTRACTION         No current outpatient medications on file. No current facility-administered medications for this visit. Allergies   Allergen Reactions    Red Dye - Food Allergy     Strawberry Extract - Food Allergy        Review of Systems   Constitutional:  Positive for fatigue and fever. HENT:  Positive for congestion, ear pain, rhinorrhea, sinus pain and sore throat. Negative for sneezing. Respiratory:  Positive for cough. Negative for shortness of breath and wheezing. Cardiovascular:  Negative for chest pain. Gastrointestinal:  Negative for abdominal pain, diarrhea, nausea and vomiting. Genitourinary:  Negative for dysuria. Musculoskeletal:  Negative for joint pain and neck pain. Skin:  Negative for rash. Neurological:  Positive for headaches. Video Exam    There were no vitals filed for this visit. Physical Exam  Constitutional:       Appearance: Normal appearance. HENT:      Head: Normocephalic.       Right Ear: External ear normal.      Left Ear: External ear normal.   Eyes:      Extraocular Movements: Extraocular movements intact. Conjunctiva/sclera: Conjunctivae normal.      Pupils: Pupils are equal, round, and reactive to light. Pulmonary:      Effort: Pulmonary effort is normal.   Neurological:      General: No focal deficit present. Mental Status: He is alert and oriented to person, place, and time.    Psychiatric:         Mood and Affect: Mood normal.         Behavior: Behavior normal.          Visit Time  Total Visit Duration: 12

## 2023-12-14 DIAGNOSIS — J01.40 ACUTE NON-RECURRENT PANSINUSITIS: ICD-10-CM

## 2023-12-14 RX ORDER — DEXTROMETHORPHAN HYDROBROMIDE AND PROMETHAZINE HYDROCHLORIDE 15; 6.25 MG/5ML; MG/5ML
5 SYRUP ORAL 4 TIMES DAILY PRN
Qty: 180 ML | Refills: 0 | Status: SHIPPED | OUTPATIENT
Start: 2023-12-14

## 2024-01-29 PROBLEM — J01.40 ACUTE NON-RECURRENT PANSINUSITIS: Status: RESOLVED | Noted: 2023-11-30 | Resolved: 2024-01-29

## 2024-04-04 ENCOUNTER — OFFICE VISIT (OUTPATIENT)
Dept: FAMILY MEDICINE CLINIC | Facility: CLINIC | Age: 24
End: 2024-04-04

## 2024-04-04 VITALS
HEART RATE: 76 BPM | HEIGHT: 73 IN | SYSTOLIC BLOOD PRESSURE: 120 MMHG | DIASTOLIC BLOOD PRESSURE: 80 MMHG | BODY MASS INDEX: 35.52 KG/M2 | WEIGHT: 268 LBS

## 2024-04-04 DIAGNOSIS — Z02.89 ENCOUNTER FOR FEDERAL AVIATION ADMINISTRATION (FAA) EXAMINATION: Primary | ICD-10-CM

## 2024-04-04 PROCEDURE — 99499 UNLISTED E&M SERVICE: CPT | Performed by: FAMILY MEDICINE

## 2024-10-22 ENCOUNTER — OFFICE VISIT (OUTPATIENT)
Dept: FAMILY MEDICINE CLINIC | Facility: CLINIC | Age: 24
End: 2024-10-22
Payer: COMMERCIAL

## 2024-10-22 VITALS
HEART RATE: 74 BPM | TEMPERATURE: 96.7 F | SYSTOLIC BLOOD PRESSURE: 132 MMHG | WEIGHT: 267 LBS | OXYGEN SATURATION: 98 % | BODY MASS INDEX: 35.39 KG/M2 | HEIGHT: 73 IN | DIASTOLIC BLOOD PRESSURE: 86 MMHG

## 2024-10-22 DIAGNOSIS — R21 RASH: Primary | ICD-10-CM

## 2024-10-22 PROCEDURE — 99213 OFFICE O/P EST LOW 20 MIN: CPT | Performed by: FAMILY MEDICINE

## 2024-10-22 RX ORDER — CLOTRIMAZOLE 1 %
CREAM (GRAM) TOPICAL 2 TIMES DAILY
Qty: 28 G | Refills: 1 | Status: SHIPPED | OUTPATIENT
Start: 2024-10-22

## 2024-10-22 NOTE — PROGRESS NOTES
"Ambulatory Visit  Name: Dick Martinez      : 2000      MRN: [id number]  Encounter Provider: Zachary Fuentes MD  Encounter Date: 10/22/2024   Encounter department: [facility] PRIMARY CARE    Assessment & Plan  Rash  Rash seems to be consistent with ringworm.  We will trial patient on clotrimazole.  He will call us back in a few weeks if there is no improvement  Orders:    clotrimazole (LOTRIMIN) 1 % cream; Apply topically 2 (two) times a day       History of Present Illness     He presents today to discuss a 2-week history of a rash on his right hip.  States it is itchy at times.  Denies any bleeding from the area.    Rash  Pertinent negatives include no congestion, cough, diarrhea, fatigue, fever, rhinorrhea, shortness of breath, sore throat or vomiting.         Review of Systems   Constitutional:  Negative for activity change, appetite change, chills, fatigue and fever.   HENT:  Negative for congestion, rhinorrhea, sneezing and sore throat.    Eyes:  Negative for pain, discharge, redness and itching.   Respiratory:  Negative for cough, chest tightness, shortness of breath and wheezing.    Cardiovascular:  Negative for chest pain and palpitations.   Gastrointestinal:  Negative for abdominal pain, constipation, diarrhea, nausea and vomiting.   Musculoskeletal:  Negative for arthralgias, gait problem, myalgias and neck pain.   Skin:  Positive for rash.   Neurological:  Negative for dizziness, weakness, numbness and headaches.   Hematological:  Negative for adenopathy.   All other systems reviewed and are negative.          Objective     /86 (BP Location: Left arm, Patient Position: Sitting, Cuff Size: Large)   Pulse 74   Temp (!) 96.7 °F (35.9 °C) (Temporal)   Ht 6' 1\" (1.854 m)   Wt 121 kg (267 lb)   SpO2 98%   BMI 35.23 kg/m²     Physical Exam  Vitals reviewed.   Constitutional:       General: He is not in acute distress.     Appearance: Normal appearance. He is well-developed. "   HENT:      Head: Normocephalic and atraumatic.      Right Ear: External ear normal.      Left Ear: External ear normal.      Nose: Nose normal.      Mouth/Throat:      Mouth: Mucous membranes are moist.   Eyes:      General: No scleral icterus.        Right eye: No discharge.         Left eye: No discharge.      Conjunctiva/sclera: Conjunctivae normal.   Cardiovascular:      Rate and Rhythm: Normal rate and regular rhythm.      Pulses: Normal pulses.      Heart sounds: Normal heart sounds. No murmur heard.  Pulmonary:      Effort: Pulmonary effort is normal. No respiratory distress.      Breath sounds: Normal breath sounds. No wheezing.   Musculoskeletal:         General: No swelling, tenderness, deformity or signs of injury. Normal range of motion.      Cervical back: Normal range of motion.   Skin:     General: Skin is warm and dry.      Capillary Refill: Capillary refill takes less than 2 seconds.      Findings: Rash (right hip ringworm) present. No lesion.   Neurological:      General: No focal deficit present.      Mental Status: He is alert. Mental status is at baseline.      Motor: No weakness.      Gait: Gait normal.   Psychiatric:         Mood and Affect: Mood normal.         Behavior: Behavior normal.

## 2024-12-11 ENCOUNTER — APPOINTMENT (OUTPATIENT)
Dept: URGENT CARE | Age: 24
End: 2024-12-11

## 2025-01-08 ENCOUNTER — OFFICE VISIT (OUTPATIENT)
Dept: FAMILY MEDICINE CLINIC | Facility: CLINIC | Age: 25
End: 2025-01-08
Payer: COMMERCIAL

## 2025-01-08 VITALS
WEIGHT: 274.6 LBS | DIASTOLIC BLOOD PRESSURE: 86 MMHG | TEMPERATURE: 98.6 F | SYSTOLIC BLOOD PRESSURE: 126 MMHG | OXYGEN SATURATION: 99 % | HEART RATE: 79 BPM | BODY MASS INDEX: 36.23 KG/M2

## 2025-01-08 DIAGNOSIS — J02.9 SORE THROAT: Primary | ICD-10-CM

## 2025-01-08 DIAGNOSIS — E66.9 OBESITY (BMI 30-39.9): ICD-10-CM

## 2025-01-08 DIAGNOSIS — B35.4 TINEA CORPORIS: ICD-10-CM

## 2025-01-08 PROCEDURE — 99214 OFFICE O/P EST MOD 30 MIN: CPT | Performed by: FAMILY MEDICINE

## 2025-01-08 RX ORDER — CLOTRIMAZOLE AND BETAMETHASONE DIPROPIONATE 10; .64 MG/G; MG/G
CREAM TOPICAL 2 TIMES DAILY
Qty: 45 G | Refills: 1 | Status: SHIPPED | OUTPATIENT
Start: 2025-01-08 | End: 2025-01-08

## 2025-01-08 RX ORDER — FLUCONAZOLE 150 MG/1
150 TABLET ORAL ONCE
Qty: 1 TABLET | Refills: 0 | Status: SHIPPED | OUTPATIENT
Start: 2025-01-08 | End: 2025-01-08

## 2025-01-08 RX ORDER — AZITHROMYCIN 250 MG/1
TABLET, FILM COATED ORAL
Qty: 6 TABLET | Refills: 0 | Status: SHIPPED | OUTPATIENT
Start: 2025-01-08 | End: 2025-01-13

## 2025-01-08 RX ORDER — AZITHROMYCIN 250 MG/1
TABLET, FILM COATED ORAL
Qty: 6 TABLET | Refills: 0 | Status: SHIPPED | OUTPATIENT
Start: 2025-01-08 | End: 2025-01-08

## 2025-01-08 RX ORDER — CLOTRIMAZOLE AND BETAMETHASONE DIPROPIONATE 10; .64 MG/G; MG/G
CREAM TOPICAL 2 TIMES DAILY
Qty: 45 G | Refills: 1 | Status: SHIPPED | OUTPATIENT
Start: 2025-01-08

## 2025-01-08 NOTE — PATIENT INSTRUCTIONS
Sore Throat (Pt felt sore throat since MICAH and left tonsil feels agitated. )  Start abx for sore throat and also gargle TID and start lotrisone and diflucan for tinea corporis right hip area for 3 months. Call if not better and consider derm consult.

## 2025-01-08 NOTE — PROGRESS NOTES
Name: Dick Martinez      : 2000      MRN: [id number]  Encounter Provider: Eamon Ortiz DO  Encounter Date: 2025   Encounter department: [facility] PRIMARY CARE  :  Chief Complaint   Patient presents with    Sore Throat     Pt felt sore throat since MICAH and left tonsil feels agitated.      Patient Instructions   Sore Throat (Pt felt sore throat since MICAH and left tonsil feels agitated. )  Start abx for sore throat and also gargle TID and start lotrisone and diflucan for tinea corporis right hip area for 3 months. Call if not better and consider derm consult.     Assessment & Plan  Sore throat  Start abx  Orders:    azithromycin (Zithromax) 250 mg tablet; Take 2 tablets (500 mg total) by mouth daily for 1 day, THEN 1 tablet (250 mg total) daily for 4 days.    Tinea corporis  Start lotrisone and take Diflucan  Orders:    clotrimazole-betamethasone (LOTRISONE) 1-0.05 % cream; Apply topically 2 (two) times a day    fluconazole (DIFLUCAN) 150 mg tablet; Take 1 tablet (150 mg total) by mouth once for 1 dose    Obesity (BMI 30-39.9)  Lose weight to get BMI lower than 25                History of Present Illness     Sore Throat (Pt felt sore throat since MICAH and left tonsil feels agitated. )    Sore Throat       Review of Systems   Constitutional: Negative.    HENT:  Positive for sore throat.    Eyes: Negative.    Respiratory: Negative.     Cardiovascular: Negative.    Gastrointestinal: Negative.    Endocrine: Negative.    Genitourinary: Negative.    Musculoskeletal: Negative.    Skin: Negative.    Allergic/Immunologic: Negative.    Neurological: Negative.    Hematological: Negative.    Psychiatric/Behavioral: Negative.         Objective   /86 (BP Location: Left arm, Patient Position: Sitting, Cuff Size: Large)   Pulse 79   Temp 98.6 °F (37 °C)   Wt 125 kg (274 lb 9.6 oz)   SpO2 99%   BMI 36.23 kg/m²      Physical Exam  Constitutional:       Appearance: He is well-developed.   HENT:       Head: Normocephalic and atraumatic.      Right Ear: Tympanic membrane, ear canal and external ear normal.      Left Ear: Tympanic membrane, ear canal and external ear normal.      Nose: Nose normal.      Mouth/Throat:      Mouth: Mucous membranes are moist.      Pharynx: Posterior oropharyngeal erythema present.   Eyes:      Conjunctiva/sclera: Conjunctivae normal.      Pupils: Pupils are equal, round, and reactive to light.   Cardiovascular:      Rate and Rhythm: Normal rate and regular rhythm.      Heart sounds: Normal heart sounds.   Pulmonary:      Effort: Pulmonary effort is normal.      Breath sounds: Normal breath sounds.   Abdominal:      Palpations: Abdomen is soft.   Musculoskeletal:         General: Normal range of motion.      Cervical back: Normal range of motion and neck supple.   Skin:     General: Skin is warm and dry.      Capillary Refill: Capillary refill takes less than 2 seconds.   Neurological:      General: No focal deficit present.      Mental Status: He is alert and oriented to person, place, and time.      Deep Tendon Reflexes: Reflexes are normal and symmetric.   Psychiatric:         Mood and Affect: Mood normal.         Behavior: Behavior normal.       Administrative Statements   I have spent a total time of 23 minutes in caring for this patient on the day of the visit/encounter including Diagnostic results, Prognosis, Risks and benefits of tx options, Instructions for management, Patient and family education, Importance of tx compliance, Risk factor reductions, Impressions, Counseling / Coordination of care, Documenting in the medical record, Reviewing / ordering tests, medicine, procedures  , and Obtaining or reviewing history  .